# Patient Record
Sex: FEMALE | Race: OTHER
[De-identification: names, ages, dates, MRNs, and addresses within clinical notes are randomized per-mention and may not be internally consistent; named-entity substitution may affect disease eponyms.]

---

## 2021-01-13 ENCOUNTER — HOSPITAL ENCOUNTER (INPATIENT)
Dept: HOSPITAL 72 - EMR | Age: 61
LOS: 1 days | Discharge: HOME | DRG: 177 | End: 2021-01-14
Payer: MEDICARE

## 2021-01-13 VITALS — SYSTOLIC BLOOD PRESSURE: 115 MMHG | DIASTOLIC BLOOD PRESSURE: 67 MMHG

## 2021-01-13 VITALS — HEIGHT: 63 IN | BODY MASS INDEX: 28.35 KG/M2 | WEIGHT: 160 LBS

## 2021-01-13 VITALS — SYSTOLIC BLOOD PRESSURE: 105 MMHG | DIASTOLIC BLOOD PRESSURE: 62 MMHG

## 2021-01-13 VITALS — SYSTOLIC BLOOD PRESSURE: 97 MMHG | DIASTOLIC BLOOD PRESSURE: 58 MMHG

## 2021-01-13 VITALS — DIASTOLIC BLOOD PRESSURE: 87 MMHG | SYSTOLIC BLOOD PRESSURE: 128 MMHG

## 2021-01-13 DIAGNOSIS — J12.82: ICD-10-CM

## 2021-01-13 DIAGNOSIS — I95.9: ICD-10-CM

## 2021-01-13 DIAGNOSIS — Z88.0: ICD-10-CM

## 2021-01-13 DIAGNOSIS — U07.1: Primary | ICD-10-CM

## 2021-01-13 DIAGNOSIS — E87.2: ICD-10-CM

## 2021-01-13 DIAGNOSIS — I10: ICD-10-CM

## 2021-01-13 DIAGNOSIS — Z79.84: ICD-10-CM

## 2021-01-13 DIAGNOSIS — C78.2: ICD-10-CM

## 2021-01-13 DIAGNOSIS — K76.0: ICD-10-CM

## 2021-01-13 DIAGNOSIS — J96.91: ICD-10-CM

## 2021-01-13 DIAGNOSIS — C34.90: ICD-10-CM

## 2021-01-13 DIAGNOSIS — Z88.8: ICD-10-CM

## 2021-01-13 DIAGNOSIS — E11.65: ICD-10-CM

## 2021-01-13 DIAGNOSIS — E11.9: ICD-10-CM

## 2021-01-13 LAB
ADD MANUAL DIFF: YES
ALBUMIN SERPL-MCNC: 3.6 G/DL (ref 3.4–5)
ALBUMIN/GLOB SERPL: 0.9 {RATIO} (ref 1–2.7)
ALP SERPL-CCNC: 99 U/L (ref 46–116)
ALT SERPL-CCNC: 165 U/L (ref 12–78)
ANION GAP SERPL CALC-SCNC: 11 MMOL/L (ref 5–15)
APPEARANCE UR: CLEAR
APTT BLD: 29 SEC (ref 23–33)
APTT PPP: YELLOW S
AST SERPL-CCNC: 127 U/L (ref 15–37)
BILIRUB SERPL-MCNC: 0.2 MG/DL (ref 0.2–1)
BUN SERPL-MCNC: 15 MG/DL (ref 7–18)
CALCIUM SERPL-MCNC: 8.7 MG/DL (ref 8.5–10.1)
CHLORIDE SERPL-SCNC: 98 MMOL/L (ref 98–107)
CK SERPL-CCNC: 303 U/L (ref 26–308)
CO2 SERPL-SCNC: 25 MMOL/L (ref 21–32)
CREAT SERPL-MCNC: 1 MG/DL (ref 0.55–1.3)
ERYTHROCYTE [DISTWIDTH] IN BLOOD BY AUTOMATED COUNT: 12.9 % (ref 11.6–14.8)
FERRITIN SERPL-MCNC: 1006 NG/ML (ref 8–388)
GLOBULIN SER-MCNC: 3.8 G/DL
GLUCOSE UR STRIP-MCNC: NEGATIVE MG/DL
HCT VFR BLD CALC: 36.9 % (ref 37–47)
HGB BLD-MCNC: 12.6 G/DL (ref 12–16)
INR PPP: 1 (ref 0.9–1.1)
KETONES UR QL STRIP: NEGATIVE
LDH SERPL L TO P-CCNC: 282 U/L (ref 81–234)
LEUKOCYTE ESTERASE UR QL STRIP: (no result)
MCV RBC AUTO: 92 FL (ref 80–99)
NITRITE UR QL STRIP: NEGATIVE
PH UR STRIP: 5 [PH] (ref 4.5–8)
PLATELET # BLD: 105 K/UL (ref 150–450)
POTASSIUM SERPL-SCNC: 3.9 MMOL/L (ref 3.5–5.1)
PROT UR QL STRIP: (no result)
RBC # BLD AUTO: 3.99 M/UL (ref 4.2–5.4)
SODIUM SERPL-SCNC: 134 MMOL/L (ref 136–145)
SP GR UR STRIP: 1.01 (ref 1–1.03)
UROBILINOGEN UR-MCNC: NORMAL MG/DL (ref 0–1)
WBC # BLD AUTO: 2.5 K/UL (ref 4.8–10.8)

## 2021-01-13 PROCEDURE — 83615 LACTATE (LD) (LDH) ENZYME: CPT

## 2021-01-13 PROCEDURE — 81003 URINALYSIS AUTO W/O SCOPE: CPT

## 2021-01-13 PROCEDURE — 71045 X-RAY EXAM CHEST 1 VIEW: CPT

## 2021-01-13 PROCEDURE — 99291 CRITICAL CARE FIRST HOUR: CPT

## 2021-01-13 PROCEDURE — 85610 PROTHROMBIN TIME: CPT

## 2021-01-13 PROCEDURE — 86140 C-REACTIVE PROTEIN: CPT

## 2021-01-13 PROCEDURE — 84100 ASSAY OF PHOSPHORUS: CPT

## 2021-01-13 PROCEDURE — 93005 ELECTROCARDIOGRAM TRACING: CPT

## 2021-01-13 PROCEDURE — 36415 COLL VENOUS BLD VENIPUNCTURE: CPT

## 2021-01-13 PROCEDURE — 85730 THROMBOPLASTIN TIME PARTIAL: CPT

## 2021-01-13 PROCEDURE — 82550 ASSAY OF CK (CPK): CPT

## 2021-01-13 PROCEDURE — 83605 ASSAY OF LACTIC ACID: CPT

## 2021-01-13 PROCEDURE — 85025 COMPLETE CBC W/AUTO DIFF WBC: CPT

## 2021-01-13 PROCEDURE — 80048 BASIC METABOLIC PNL TOTAL CA: CPT

## 2021-01-13 PROCEDURE — 80053 COMPREHEN METABOLIC PANEL: CPT

## 2021-01-13 PROCEDURE — 87040 BLOOD CULTURE FOR BACTERIA: CPT

## 2021-01-13 PROCEDURE — 85007 BL SMEAR W/DIFF WBC COUNT: CPT

## 2021-01-13 PROCEDURE — 82962 GLUCOSE BLOOD TEST: CPT

## 2021-01-13 PROCEDURE — 71275 CT ANGIOGRAPHY CHEST: CPT

## 2021-01-13 PROCEDURE — 96365 THER/PROPH/DIAG IV INF INIT: CPT

## 2021-01-13 PROCEDURE — 85379 FIBRIN DEGRADATION QUANT: CPT

## 2021-01-13 PROCEDURE — 84484 ASSAY OF TROPONIN QUANT: CPT

## 2021-01-13 PROCEDURE — 96375 TX/PRO/DX INJ NEW DRUG ADDON: CPT

## 2021-01-13 PROCEDURE — 96367 TX/PROPH/DG ADDL SEQ IV INF: CPT

## 2021-01-13 PROCEDURE — 83880 ASSAY OF NATRIURETIC PEPTIDE: CPT

## 2021-01-13 PROCEDURE — 82728 ASSAY OF FERRITIN: CPT

## 2021-01-13 PROCEDURE — 83735 ASSAY OF MAGNESIUM: CPT

## 2021-01-13 RX ADMIN — BAMLANIVIMAB SCH MLS/HR: 35 INJECTION, SOLUTION INTRAVENOUS at 12:15

## 2021-01-13 RX ADMIN — BAMLANIVIMAB SCH MLS/HR: 35 INJECTION, SOLUTION INTRAVENOUS at 13:11

## 2021-01-13 NOTE — DIAGNOSTIC IMAGING REPORT
Indication: Shortness of breath

 

Technique: One view of the chest

 

Comparison: 10/6/2017

 

Findings: Interim development of bilateral peripheral infiltrates. There is some

blunting of the right costophrenic sulcus again demonstrated. Again demonstrated are

linear right perihilar opacities, likely areas of scarring given persistence since

prior study Heart size is normal.

 

Impression: Bilateral peripheral infiltrates, concerning for multifocal pneumonia,

possibly viral

 

Blunted right costophrenic sulcus, small effusion possible

## 2021-01-13 NOTE — CONSULTATION
Consult Note


Consult Note


DATE OF CONSULTATION: 1/13/2021


CONSULTING PHYSICIAN:  Torsten Augustin MD.





ATTENDING PHYSICIAN: Dr. To





REASON FOR CONSULTATION: COVID-19, history of lung cancer





HISTORY OF PRESENT ILLNESS:  


This is a 60-year-old female with history of diabetes mellitus, and stage IV 

lung cancer who was referred to ER by her pulmonologist for her Covid positive 

status with hypoxia.  However, patient was reported to be saturating well on 

room air on arrival.  Patient was initially afebrile, however was hypotensive.  

Patient reports 3 days history of pleuritic chest pain and shortness of breath 

along with headache and dizziness.  Given her high risk for PE, she was 

evaluated with CT angiogram of chest, which showed no evidence of acute 

pulmonary embolus or other acute thoracic vascular pathology.  Chest x-ray is 

notable for bilateral peripheral infiltrates, and possible small effusion on the

right.  The exact date of COVID-19 test is not available from EMR.





Patient was given Rocephin, and bamalnivimab in the ER and is awaiting 

admission.





PAST MEDICAL HISTORY: Diabetes mellitus, stage IV lung cancer





MEDICATIONS: Amlodipine, atorvastatin, codeine/promethazine, 

hydrocodone/acetaminophen, Metformin, sitagliptin





ALLERGIES: Diatrizoate meglumine, diatrizoate sodium, iodine, iopamidol, 

penicillins





FAMILY HISTORY: Unknown





PERSONAL/SOCIAL HISTORY: Lives at home with family





REVIEW OF SYSTEMS: Negative except mentioned in HPI





PHYSICAL EXAMINATION:


VITAL SIGNS:  Blood pressure 105/65, heart rate 85, respiratory rate 15,


weight 73 kg, height 160 cm.


General: Appears stated age, NAD; normal work of breathing on room air


HEENT:  Head exam reveals that the head is normocephalic, atraumatic


without deformity or unusual swelling.  Pupils are PERRLA.


CHEST AND LUNGS:  Reveals clear, normal, symmetrical breath sounds with no


adventitious sounds.


CARDIOVASCULAR:  Reveals normal S1, S2 without murmurs, rubs, or clicks.


ABDOMEN:  Soft with no tenderness or organomegaly.


RECTAL:  Deferred.


MUSCULOSKELETAL:  There is no tenderness to palpation.  Range of motion is


normal.


NEUROLOGICAL:  Alert and oriented x3 , nonfocal





LABORATORY DATA:  


Laboratory testing shows WBC 2.5, hemoglobin 12.6, hematocrit 36.9, platelet 

count 105





Chemistries show sodium 134, glucose 143, lactic acid 3.9, ferritin 1006, AST 

127, , 


Urinalysis shows 1+ protein, 1+ leuk esterase


Assessment/Plan


1. COVID-19 pneumonia


   - s/p Bamlanivimab in ER


   - s/p Decadron once in ER


   - s/p Ceftriaxoone once in ER


   - currently saturating at 90-94% on RA


   -Monitor for hypoxia, and provide supplemental oxygen as needed


   - We will initiate rocephin


2. Elevated inflammatory markers


   -CT angio was negative for PE


   -DVT prophylaxis recommended


3. Transaminitis


   Trend LFTs


4. Diabetes mellitus with hyperglycemia


   -On glucose lowering agents


5. Hyponatremia


   -Per primary MD


   - IVF


6.  Hypotension; resolved


   - IVF


The care for this patient was discussed with my supervising physician.


Time spent for this case was approximately 31 minutes.











Rusty Esquivel                 Jan 13, 2021 17:43

## 2021-01-13 NOTE — EMERGENCY ROOM REPORT
History of Present Illness


General


Chief Complaint:  Dyspnea/Respdistress


Source:  Patient





Present Illness


HPI


60-year-old female with history of stage IV lung cancer sent by pulmonologist 

for Bamlanivimab treatment due to positive Covid status.  Patient with a steady 

97% room air is afebrile however hypotensive.  Complains of 3 days of pleuritic 

chest pain and shortness of breath.  Planes of headache and dizziness.  Has not 

taken medication for symptom relief.  Patient also diabetic currently on 3 

different medication.  Patient reports that she gets a rash with IV contrast 

dexamethasone and Benadryl surgeon of IV contrast for CTA chest with patient has

high risk factors for possible PE.


Allergies:  


Coded Allergies:  


     DIATRIZOATE MEGLUMINE (Verified  Allergy, Intermediate, rash, hives on 

face, neck and arms, 7/14/17)


   from 7/3/17


     DIATRIZOATE SODIUM (Verified  Allergy, Intermediate, rash, hives on face, 

neck and arms, 7/14/17)


   from 7/3/17


     IODINE (Verified  Allergy, Intermediate, 7/14/17)


   from gastrografin


     IOPAMIDOL (Verified  Allergy, Intermediate, rash, hives on face, neck and 

arms, 7/14/17)


   occurance from 7/3/17. [id number]


     PENICILLINS (Unverified  Allergy, Unknown, 7/3/17)





COVID-19 Screening


Contact w/high risk pt:  Yes


Experienced COVID-19 symptoms?:  Yes


COVID-19 Testing performed PTA:  Yes


COVID-19 Screening:  Positive COVID-19


COVID-19 Testing Source:  nasal





Patient History


Past Medical History:  see triage record


Past Surgical History:  none


Pertinent Family History:  none


Pregnant Now:  No


Immunizations:  UTD


Reviewed Nursing Documentation:  PMH: Agreed; PSxH: Agreed





Nursing Documentation-PMH


Past Medical History:  No History, Except For


Hx Cardiac Problems:  No - lung cancer rt side


Hx Hypertension:  Yes


Hx Asthma:  Yes


Hx Diabetes:  Yes - Type 2 oral medications


Hx Cancer:  No


Hx Gastrointestinal Problems:  No


Hx Neurological Problems:  No





Review of Systems


All Other Systems:  negative except mentioned in HPI





Physical Exam





Vital Signs








  Date Time  Temp Pulse Resp B/P (MAP) Pulse Ox O2 Delivery O2 Flow Rate FiO2


 


1/13/21 12:20  85 18     98


 


1/13/21 12:20 98.2   128/87 98 Room Air  








Sp02 EP Interpretation:  reviewed, abnormal - hypotensive


General Appearance:  no apparent distress, alert, GCS 15, non-toxic


Head:  normocephalic, atraumatic


Eyes:  bilateral eye normal inspection, bilateral eye PERRL


ENT:  hearing grossly normal, normal pharynx, no angioedema, normal voice


Neck:  full range of motion, supple, no meningismus, supple/symm/no masses


Respiratory:  no respiratory distress, no retraction, no accessory muscle use


Cardiovascular #1:  regular rate, rhythm, no edema


Gastrointestinal:  soft, no organomegaly, no peritonitis


Musculoskeletal:  back normal, no calf tenderness


Neurologic:  alert, motor strength/tone normal, oriented x3, sensory intact, 

responsive, speech normal


Psychiatric:  judgement/insight normal, memory normal, mood/affect normal, no 

suicidal/homicidal ideation


Skin:  no rash


Lymphatic:  no adenopathy





Procedures


Critical Care Time


Critical Care Time


Total critical care time; approximately 31 minutes.  Due to a high probability 

of clinically significant, life threatening deterioration, the patient required 

my highest level of preparedness to intervene emergently and I personally spent 

this critical care time directly and personally managing the patient.  This 

critical care time included obtaining a history; examining the patient; pulse 

oximetry; ordering and reviewing of studies; arranging urgent treatment with 

development of a management plan; evaluation of patient's response to treatment;

frequent reassessment; and, discussions with other providers.  This critical 

care time was performed to assess and manage the high probability of imminent, 

life-threatening deterioration that could result in multiorgan failure it was 

exclusive of separate billable procedures and treating other patients and 

teaching time.  Please see MDM section and the rest of the note for further 

information on patient assessment and treatment.





Medical Decision Making


PA Attestation


All diagnosis and treatment plans were discussed and reviewed by my supervising 

physician Dr. Hays


Diagnostic Impression:  


   Primary Impression:  


   Sepsis


   Additional Impressions:  


   Coronavirus infection


   Pleural malignant neoplasm


ER Course


60-year-old female with history of stage IV lung cancer sent by pulmonologist 

for Bamlanivimab treatment due to positive Covid status.  Patient with a steady 

97% room air is afebrile however hypotensive.  Complains of 3 days of pleuritic 

chest pain and shortness of breath.  Planes of headache and dizziness.  Has not 

taken medication for symptom relief.  Patient also diabetic currently on 3 

different medication.  Patient reports that she gets a rash with IV contrast 

dexamethasone and Benadryl surgeon of IV contrast for CTA chest with patient has

high risk factors for possible PE.





Ddx considered but are not limited to: bronchitis, PNA, URI viral, bacterial 

brochitis, PE, respiratory distress, hypoxia, Covid pneumonia





Vital signs: are WNL, pt. is afebrile





H&PE are most consistent with: Sepsis,





ORDERS: ER Covid order set, CTA chest





ED INTERVENTIONS: Rocephin,Bamalnivimab





Patient was admitted with diagnosis of sepsis      to Dr. Augustin     under 

supervision of : Saúl








pt stable at time of admission


EKG Diagnostic Results


Rate:  tachycardiac


Rhythm:  other - tachy


ST Segments:  no acute changes


Other Impression


No acute ST changes


ASA given to the pt in ED:  No





Chest X-Ray Diagnostic Results


Chest X-Ray Diagnostic Results :  


   Chest X-Ray Ordered:  Yes


   # of Views/Limited/Complete:  1 View


   Indication:  Shortness of Breath


   EP Interpretation:  Yes


   PA Xray:  Interpretation reviewed, by supervising MD, and agrees with 

findings.


   Interpretation:  other - Pneumonia


   Impression:  Other - Pneumonia


   Electronically Signed by:  Selvin Schulte PA-C





CT/MRI/US Diagnostic Results


CT/MRI/US Diagnostic Results :  


   Imaging Test Ordered:  CTA chest


   Impression


Impression: No evidence of acute pulmonary embolus or other acute thoracic 

vascular


pathology


 


Groundglass opacities in the left lung, raises concern for multifocal pneumonia,


quite possibly viral. Note in usual absence of similar findings on the right


 


Right pulmonary perihilar mass, suspicious for malignancy, also previously 

described.


Correlate with results in any prior workup


 


Pleural thickening and nodularity on the right, somewhat similar to findings


previously described but more advanced. As previously reported, likely 

represents


metastatic deposits. The absence of pleural fluid and the presence of possible


calcification in the pleural space raises possibility of interim pleurodesis.


Correlate with surgical history


 


Right basilar scarring and/or atelectasis


 


Mild cardiomegaly


 


Enlarged fatty liver, also previously reported





Last Vital Signs








  Date Time  Temp Pulse Resp B/P (MAP) Pulse Ox O2 Delivery O2 Flow Rate FiO2


 


1/13/21 14:24 98.0 87 17 97/58 99 Room Air  


 


1/13/21 12:20        98








Disposition:  ADMITTED AS INPATIENT


Condition:  Serious


Referrals:  


Torsten Augustin MD (PCP)











Selvin Hoffman      Jan 13, 2021 14:58

## 2021-01-13 NOTE — NUR
TRANSFER TO FLOOR:



Patient transferred to TELE at rm 207 via [facility] with cardiac monitor 
accompanied by RN.  Belongings checked and given to RN. Pt transferred safely 
to bed and endorsed to RN

## 2021-01-13 NOTE — NUR
NURSE NOTES:

DR. GAMA CALLED AND STATED THAT THE PT WILL BE ADMITTED UNDER HIS CARE AND DR. HUANG 
WILL BE ADDED AS CONSULT. ADMISSION ORDERS GIVEN WILL NOTE & CARRY OUT. EXPLAINED TO DR. GAMA THAT PT IS ON A TRIAL MEDICATION FOR LUNG CANCER TX, AND PT BROUGHT MEDICATION WITH 
HER TO THE HOSPITAL, BUT PER HOSPITAL PHARMACIST WE WILL NOT BE ABLE TO DISPENSE MEDICATION 
BECAUSE MEDICATION HAS NO NAME ON THE BOTTLE.  DR. GAMA STATED HE WILL FALLOW UP WITH DR. SCHULTZ IN AM REGARDING CANCER DRUGS.

## 2021-01-13 NOTE — NUR
NURSE NOTES:

AT 2020 RECEIVED REPORT FROM FRANCOISE MIGUEL FROM ED.PT ARRIVED ON THE FLOOR AT 2045 VIA GURNEY. 
ASSISTED PT FROM GURNEY TO BED, PT ABLE TO AMBULATE WITH SLOW STEADY GAIT. PT ALERT, 
ORIENTED X4, ABLE TO MAKE NEEDS KNOWN IN [place] & ENGLISH. NO RESP DISTRESS NOTED, SATING 
92-93% ON ROOM AIR. PLACED ON CARDIAC MONITOR RUNNING NORMAL SINUS RHYTHM. BELONGING LIST 
CHECK ED NURSE. BODY CHECK DONE. ASSISTED PT TO REST ROOM. ORIENTED PT ROOM, UNIT. PLACED 
BED IN LOW POSITION & LOCKED. SIDE RAILS UP X3. CALL LIGHT WITH IN REACH. BED ALARM ON.  
WILL CALL DR. LESE FOR ADMISSION ORDERS.

## 2021-01-13 NOTE — NUR
-------------------------------------------------------------------------------

           *** Note undone in EDM - 01/13/21 at 1650 by TRAVON ***           

-------------------------------------------------------------------------------

ED Nurse Note:

Pt is y

## 2021-01-13 NOTE — NUR
ED Nurse Note:

Pt walked in, was referred by Dr Augustin for COVID antibiotics. Pt is alert and 
orientedx4, ambulatory. She has been seen by ERMD. Pt is placed on monitor. She 
had SOB at doctors office, O2 sat is 99% RA.

## 2021-01-13 NOTE — DIAGNOSTIC IMAGING REPORT
ndication: Chest pain, shortness of breath

 

Technique: IV administration nonionic contrast. Spiral acquisitions obtained from the

lung bases to the lung apices. Multiplanar and 3-D reconstructions were generated on

an integrated workstation. Total dose length product 192 mGycm. CTDIvol(s) 3, 24, 4

mGy.  Dose reduction achieved using automated exposure control

 

 

Comparison: Conventional chest CT 7/3/2017

 

Findings: The pulmonary arteries are adequately opacified. No intraluminal filling

defects or other findings to suggest acute pulmonary embolus are demonstrated. No

evidence of pulmonary arterial dilatation. No evidence of thoracic aortic aneurysm or

dissection. Normal branching anatomy and caliber of the great neck vessels. No

evidence of right ventricular dilatation. The included proximal abdominal visceral

vessels are unremarkable.

 

There is a mass in the right pulmonary perihilar region. This measures approximately

4.6 x 3.3 x 3.1 cm in diameter. It is mostly hypoattenuating with some areas of

hyperattenuation. Some masslike opacity also extends from this into the major

fissure. There are some areas of density at the pleural surface of the right lower

lobe. The largest of these is somewhat masslike, measures 2.1 x 4.5 cm. Numerous

bands of atelectasis are seen throughout the right lower lobe and to some extent

within the right upper and middle lobes. There is considerable pleural thickening on

the right, particularly in the mid and lower lung. Some of the pleural thickening is

very dense. There may be trace pleural fluid on the right. Findings are difficult to

compare to the previous exam, as previous study demonstrated a massive pleural

effusion resulting in atelectasis of the entire right lower lobe, most of the right

middle lobe, and much of the upper lobe. However, the right hilar mass was reported

on the previous exam

 

Numerous areas of groundglass opacity are seen scattered throughout the left lung.

Findings in the left lung are new since the previous study. No pleural effusion or

mass is seen on the left. No dense consolidation is seen on the left. Findings on the

left are new since the prior study

 

The heart is mildly enlarged. No pericardial effusion. There is some prominence to

the perihilar nodes on the left but no alice adenopathy. No mediastinal

lymphadenopathy. The included portion of the thyroid is unremarkable. No axillary or

chest wall mass or adenopathy.

 

The included upper abdominal anatomy demonstrates somewhat enlarged hypoattenuating

liver. This is also demonstrated previously.

 

Impression: No evidence of acute pulmonary embolus or other acute thoracic vascular

pathology

 

Groundglass opacities in the left lung, raises concern for multifocal pneumonia,

quite possibly viral. Note in usual absence of similar findings on the right

 

Right pulmonary perihilar mass, suspicious for malignancy, also previously described.

Correlate with results in any prior workup

 

Pleural thickening and nodularity on the right, somewhat similar to findings

previously described but more advanced. As previously reported, likely represents

metastatic deposits. The absence of pleural fluid and the presence of possible

calcification in the pleural space raises possibility of interim pleurodesis.

Correlate with surgical history

 

Right basilar scarring and/or atelectasis

 

Mild cardiomegaly

 

Enlarged fatty liver, also previously reported

 

 

 

The CT scanner at [facility] is accredited by the [employer] College of

Radiology and the scans are performed using protocols designed to limit radiation

exposure to as low as reasonably achievable to attain images of sufficient resolution

adequate for diagnostic evaluation.

## 2021-01-14 VITALS — DIASTOLIC BLOOD PRESSURE: 75 MMHG | SYSTOLIC BLOOD PRESSURE: 114 MMHG

## 2021-01-14 VITALS — DIASTOLIC BLOOD PRESSURE: 64 MMHG | SYSTOLIC BLOOD PRESSURE: 103 MMHG

## 2021-01-14 VITALS — DIASTOLIC BLOOD PRESSURE: 67 MMHG | SYSTOLIC BLOOD PRESSURE: 102 MMHG

## 2021-01-14 VITALS — SYSTOLIC BLOOD PRESSURE: 105 MMHG | DIASTOLIC BLOOD PRESSURE: 67 MMHG

## 2021-01-14 VITALS — DIASTOLIC BLOOD PRESSURE: 65 MMHG | SYSTOLIC BLOOD PRESSURE: 112 MMHG

## 2021-01-14 LAB
ADD MANUAL DIFF: YES
ANION GAP SERPL CALC-SCNC: 6 MMOL/L (ref 5–15)
BUN SERPL-MCNC: 14 MG/DL (ref 7–18)
CALCIUM SERPL-MCNC: 8.8 MG/DL (ref 8.5–10.1)
CHLORIDE SERPL-SCNC: 98 MMOL/L (ref 98–107)
CO2 SERPL-SCNC: 27 MMOL/L (ref 21–32)
CREAT SERPL-MCNC: 0.8 MG/DL (ref 0.55–1.3)
ERYTHROCYTE [DISTWIDTH] IN BLOOD BY AUTOMATED COUNT: 12.9 % (ref 11.6–14.8)
HCT VFR BLD CALC: 33.9 % (ref 37–47)
HGB BLD-MCNC: 11.5 G/DL (ref 12–16)
MCV RBC AUTO: 92 FL (ref 80–99)
PHOSPHATE SERPL-MCNC: 2.4 MG/DL (ref 2.5–4.9)
PLATELET # BLD: 103 K/UL (ref 150–450)
POTASSIUM SERPL-SCNC: 3.9 MMOL/L (ref 3.5–5.1)
RBC # BLD AUTO: 3.68 M/UL (ref 4.2–5.4)
SODIUM SERPL-SCNC: 131 MMOL/L (ref 136–145)
WBC # BLD AUTO: 2.9 K/UL (ref 4.8–10.8)

## 2021-01-14 RX ADMIN — INSULIN ASPART SCH UNITS: 100 INJECTION, SOLUTION INTRAVENOUS; SUBCUTANEOUS at 16:30

## 2021-01-14 RX ADMIN — INSULIN ASPART SCH UNITS: 100 INJECTION, SOLUTION INTRAVENOUS; SUBCUTANEOUS at 06:30

## 2021-01-14 RX ADMIN — INSULIN ASPART SCH UNITS: 100 INJECTION, SOLUTION INTRAVENOUS; SUBCUTANEOUS at 12:49

## 2021-01-14 NOTE — NUR
NURSE NOTES:

Patient discharged home with daughter Silvia Colon in private vehicle. Patient verified and 
acknowledged belongings list and patient instructions given about patients stay at hospital, 
diagnosis and discharge instructions. Patient education on medications to be taken at home 
and with prescriptions given by MD. Patient is stable with stable VS, no complaints of pain 
and no acute signs of distress. The patients IV was removed and was clean and intact. The 
patients Tele monitor was removed. All patient questions answered and request met. The 
patient was walked down to private vehicle and was assisted into vehicle with LAMONT Steele. 
The patient left the floor at 1900.

## 2021-01-14 NOTE — NUR
CASE MANAGEMENT:REVIEW



60 YR OLD FEMALE WALKED INTO ER



CC: SOB AND SENT BY DR LEES



SI: SEPSIS. COVID INFECTION. LUNG CANCER

BILATERAL INFILTRATES

98.2   85  18  105/62  98% ON RA

WBC-2.5   PLT-105  LACTIC ACID+3.90



IS: 1L NS BOLUS

IV DECADRON

IV BENADRYL

IV FENTANYL

IV ROCEPHIN

IV BAMLANIVIMAB  (EXPERIMENTAL )

**: TO TELEMETRY

DCP: FROM HOME

## 2021-01-14 NOTE — NUR
NURSE NOTES:

Received patient from LAMONT Torres. Patient seen in bed in semifowlers position eating 
breakfast and watching TV. The patient does not complain of any pain and is under no acute 
signs of distress. The patient has a right 20G AC running NS at 50cc that is clean patent 
and intact. The patient is on room air and is saturating within normal limits. the Patients 
bed is in lowest position, locked, side rails x2, call light within reach and bed alarm in 
zone 1.

## 2021-01-14 NOTE — HISTORY & PHYSICAL
History of Present Illness


General


Reason for Hospitalization:  Dyspnea/Respdistress





Present Illness


HPI


60 year old female with the past medical history of DM, HTN, stage IV lung 

cancer sent by pulmonologist for Bamlanivimab treatment due to positive Covid 

status.  Patient with a steady 97% room air is afebrile however hypotensive.  

Complains of 3 days of pleuritic chest pain and shortness of breath.  Planes of 

headache and dizziness.  Has not taken medication for symptom relief.  Patient 

also diabetic currently on 3 different medication.  Patient reports that she 

gets a rash with IV contrast dexamethasone and Benadryl surgeon of IV contrast 

for CTA chest with patient has high risk factors for possible PE.





Impression: No evidence of acute pulmonary embolus or other acute thoracic 

vascular


pathology


 


Groundglass opacities in the left lung, raises concern for multifocal pneumonia,


quite possibly viral. Note in usual absence of similar findings on the right


 


Right pulmonary perihilar mass, suspicious for malignancy, also previously 

described.


Correlate with results in any prior workup


 


Pleural thickening and nodularity on the right, somewhat similar to findings


previously described but more advanced. As previously reported, likely 

represents


metastatic deposits. The absence of pleural fluid and the presence of possible


calcification in the pleural space raises possibility of interim pleurodesis.


Correlate with surgical history


 


Right basilar scarring and/or atelectasis


 


Mild cardiomegaly


 


Enlarged fatty liver, also previously reported


Allergies:  


Coded Allergies:  


     DIATRIZOATE MEGLUMINE (Verified  Allergy, Intermediate, rash, hives on 

face, neck and arms, 7/14/17)


   from 7/3/17


     DIATRIZOATE SODIUM (Verified  Allergy, Intermediate, rash, hives on face, 

neck and arms, 7/14/17)


   from 7/3/17


     IODINE (Verified  Allergy, Intermediate, 7/14/17)


   from gastrografin


     IOPAMIDOL (Verified  Allergy, Intermediate, rash, hives on face, neck and 

arms, 7/14/17)


   occurance from 7/3/17. [id number]


     PENICILLINS (Unverified  Allergy, Unknown, 7/3/17)





COVID-19 Screening


Contact w/high risk pt:  Yes


Experienced COVID-19 symptoms?:  Yes


Coronavirus symptoms experienc:  Shortness of Breath





Medication History


Scheduled


Amlodipine Besylate/Benazepril 5-20 Mg (Lotrel 5-20 Mg Capsule*), 1 CAP ORAL 

DAILY, (Reported)


Atorvastatin Calcium* (Atorvastatin Calcium*), 5 MG ORAL BEDTIME, (Reported)


Azithromycin* (Zithromax*), 500 MG ORAL DAILY


Dexamethasone (Dexamethasone), 6 MG ORAL DAILY


Metformin Hcl* (Metformin Hcl*), 1,000 MG ORAL DAILY, (Reported)


Sitagliptin* (Januvia*), 100 MG ORAL DAILY, (Reported)


[Patient's Own Med], 1 EA ORAL DAILY


[Patient's Own Med], 1 EA ORAL DAILY





Scheduled PRN


Codeine/Promethazine Hcl* (Promethazine-Codeine Syrup*), 5 ML ORAL EVERY 8 HOURS

PRN for For Cough, (Reported)


Hydrocodone Bit/Acetaminophen * (Norco *), 1 TAB ORAL Q4H PRN for 

For Pain, (Reported)





Discontinued Medications


Hydrocodone Bit/Acetaminophen * (Norco *), 1 TAB ORAL Q4H PRN for 

For Pain, (Reported)


   Discontinued Reason: MD discontinued med





Patient History


Healthcare decision maker





Resuscitation status





Advanced Directive on File








Review of Systems


Review of Symptoms


General ROS: no weight loss or fever


Psychological ROS: no depression or mood changes, no memory loss


Ophthalmic ROS: no visual changes or eye irritation


ENT ROS: no nasal congestion, hearing loss, dizziness


Allergy and Immunology ROS: no allergic symptoms or urticaria


Hematological and Lymphatic ROS: no swollen glands, unusual bleeding or bruising


Endocrine ROS: no polyuria, polydipsia, weight changes, temperature intolerance


Respiratory ROS: no cough, shortness of breath, or wheezing


Cardiovascular ROS: no chest pain or dyspnea on exertion


Gastrointestinal ROS: denies abdominal pain, bright red blood in stool.


Musculoskeletal ROS: no myalgias or arthralgias


Neurological ROS: no TIA or stroke symptoms


Dermatological ROS: no new or changing skin lesions, rashes or pruritis





Physical Exam


Physical Exam


General appearance:  alert, cooperative, no distress, appears stated age


Head:  Normocephalic, without obvious abnormality, atraumatic


Eyes:  conjunctivae/corneas clear. PERRL, EOM's intact. Fundi benign


Throat:  Lips, mucosa, and tongue normal. Teeth and gums normal


Neck:  supple, symmetrical, trachea midline, no adenopathy, thyroid: not 

enlarged, symmetric, no tenderness/mass/nodules, no carotid bruit and no JVD


Lungs:  clear to auscultation bilaterally


Heart:  regular rate and rhythm, S1, S2 normal, no murmur, click, rub or gallop


Abdomen:  soft, non-tender. Bowel sounds normal. No masses,  no organomegaly


Extremities:  extremities normal, atraumatic, no cyanosis or edema


Pulses:  2+ and symmetric


Skin:  Skin color, texture, turgor normal. No rashes or lesions


Neurologic:  Grossly normal





Last 24 Hour Vital Signs








  Date Time  Temp Pulse Resp B/P (MAP) Pulse Ox O2 Delivery O2 Flow Rate FiO2


 


1/14/21 04:00  91      


 


1/14/21 04:00 97.9 74 20 112/65 (81) 92   


 


1/14/21 00:00  105      


 


1/14/21 00:00 99.6 82 20 114/75 (88) 93   


 


1/13/21 22:05      Room Air  


 


1/13/21 20:40 98.0 84 19 115/67 97 Room Air  98


 


1/13/21 18:53 98.0 84 19 115/67 97 Room Air  


 


1/13/21 16:49 98.0 85 15 105/62 96 Room Air  


 


1/13/21 14:24 98.0 87 17 97/58 99 Room Air  


 


1/13/21 13:39 98.0       


 


1/13/21 12:33 98.1 96 20 92/60 (71) 95 Room Air  


 


1/13/21 12:20 98.2 85 18 128/87 98 Room Air  


 


1/13/21 12:20  85 18     98

















Intake and Output  


 


 1/13/21 1/14/21





 19:00 07:00


 


Intake Total 0 ml 300 ml


 


Balance 0 ml 300 ml


 


  


 


Intake Oral 0 ml 300 ml


 


# Voids  3











Laboratory Tests








Test


 1/13/21


12:33 1/13/21


13:23 1/13/21


14:26 1/14/21


05:53


 


White Blood Count


 2.5 K/UL


(4.8-10.8)  L 


 


 





 


Red Blood Count


 3.99 M/UL


(4.20-5.40)  L 


 


 





 


Hemoglobin


 12.6 G/DL


(12.0-16.0) 


 


 





 


Hematocrit


 36.9 %


(37.0-47.0)  L 


 


 





 


Mean Corpuscular Volume 92 FL (80-99)     


 


Mean Corpuscular Hemoglobin


 31.6 PG


(27.0-31.0)  H 


 


 





 


Mean Corpuscular Hemoglobin


Concent 34.2 G/DL


(32.0-36.0) 


 


 





 


Red Cell Distribution Width


 12.9 %


(11.6-14.8) 


 


 





 


Platelet Count


 105 K/UL


(150-450)  L 


 


 





 


Mean Platelet Volume


 8.5 FL


(6.5-10.1) 


 


 





 


Neutrophils (%) (Auto)


 % (45.0-75.0)


 


 


 





 


Lymphocytes (%) (Auto)


 % (20.0-45.0)


 


 


 





 


Monocytes (%) (Auto)  % (1.0-10.0)     


 


Eosinophils (%) (Auto)  % (0.0-3.0)     


 


Basophils (%) (Auto)  % (0.0-2.0)     


 


Differential Total Cells


Counted 100  


 


 


 





 


Neutrophils % (Manual) 74 % (45-75)     


 


Lymphocytes % (Manual) 24 % (20-45)     


 


Monocytes % (Manual) 2 % (1-10)     


 


Eosinophils % (Manual) 0 % (0-3)     


 


Basophils % (Manual) 0 % (0-2)     


 


Band Neutrophils 0 % (0-8)     


 


Platelet Estimate Decreased  L   


 


Platelet Morphology Normal     


 


Red Blood Cell Morphology Normal     


 


Prothrombin Time


 11.1 SEC


(9.30-11.50) 


 


 





 


Prothromb Time International


Ratio 1.0 (0.9-1.1)  


 


 


 





 


Activated Partial


Thromboplast Time 29 SEC (23-33)


 


 


 





 


D-Dimer


 0.33 mg/L FEU


(0.00-0.49) 


 


 





 


Sodium Level


 134 MMOL/L


(136-145)  L 


 


 





 


Potassium Level


 3.9 MMOL/L


(3.5-5.1) 


 


 





 


Chloride Level


 98 MMOL/L


() 


 


 





 


Carbon Dioxide Level


 25 MMOL/L


(21-32) 


 


 





 


Anion Gap


 11 mmol/L


(5-15) 


 


 





 


Blood Urea Nitrogen


 15 mg/dL


(7-18) 


 


 





 


Creatinine


 1.0 MG/DL


(0.55-1.30) 


 


 





 


Estimat Glomerular Filtration


Rate 56.4 mL/min


(>60) 


 


 





 


Glucose Level


 143 MG/DL


()  H 


 


 





 


Lactic Acid Level


 3.90 mmol/L


(0.4-2.0)  H 


 2.00 mmol/L


(0.66-2.22) 





 


Calcium Level


 8.7 MG/DL


(8.5-10.1) 


 


 





 


Ferritin


 1006 NG/ML


(8-388)  H 


 


 





 


Total Bilirubin


 0.2 MG/DL


(0.2-1.0) 


 


 





 


Aspartate Amino Transf


(AST/SGOT) 127 U/L


(15-37)  H 


 


 





 


Alanine Aminotransferase


(ALT/SGPT) 165 U/L


(12-78)  H 


 


 





 


Alkaline Phosphatase


 99 U/L


() 


 


 





 


Lactate Dehydrogenase


 282 U/L


()  H 


 


 





 


Total Creatine Kinase


 303 U/L


() 


 


 





 


Troponin I


 0.000 ng/mL


(0.000-0.056) 


 


 





 


C-Reactive Protein,


Quantitative 0.6 mg/dL


(0.00-0.90) 


 


 





 


Pro-B-Type Natriuretic Peptide


 32 pg/mL


(0-125) 


 


 





 


Total Protein


 7.4 G/DL


(6.4-8.2) 


 


 





 


Albumin


 3.6 G/DL


(3.4-5.0) 


 


 





 


Globulin 3.8 g/dL     


 


Albumin/Globulin Ratio


 0.9 (1.0-2.7)


L 


 


 





 


Urine Color  Yellow    


 


Urine Appearance  Clear    


 


Urine pH  5 (4.5-8.0)    


 


Urine Specific Gravity


 


 1.015


(1.005-1.035) 


 





 


Urine Protein


 


 1+ (NEGATIVE)


H 


 





 


Urine Glucose (UA)


 


 Negative


(NEGATIVE) 


 





 


Urine Ketones


 


 Negative


(NEGATIVE) 


 





 


Urine Blood


 


 Negative


(NEGATIVE) 


 





 


Urine Nitrite


 


 Negative


(NEGATIVE) 


 





 


Urine Bilirubin


 


 Negative


(NEGATIVE) 


 





 


Urine Urobilinogen


 


 Normal MG/DL


(0.0-1.0) 


 





 


Urine Leukocyte Esterase


 


 1+ (NEGATIVE)


H 


 





 


Urine RBC


 


 0 /HPF (0 - 2)


 


 





 


Urine WBC


 


 0-2 /HPF (0 -


2) 


 





 


Urine Squamous Epithelial


Cells 


 Occasional


/LPF 


 





 


Urine Bacteria


 


 Occasional


/HPF (NONE) 


 





 


POC Whole Blood Glucose


 


 


 


 127 MG/DL


()  H








Height (Feet):  5


Height (Inches):  3.00


Weight (Pounds):  160


Medications





Current Medications








 Medications


  (Trade)  Dose


 Ordered  Sig/Elian


 Route


 PRN Reason  Start Time


 Stop Time Status Last Admin


Dose Admin


 


 Acetaminophen


  (Tylenol)  650 mg  Q6H  PRN


 ORAL


 PAIN1-3  1/13/21 22:30


 2/12/21 22:29  1/13/21 22:55





 


 Acetaminophen


  (Tylenol)  650 mg  Q6H  PRN


 ORAL


 TEMP 100.5  1/13/21 22:30


 2/12/21 22:29   





 


 Amlodipine


 Besylate


  (Norvasc)  5 mg  DAILY


 ORAL


   1/14/21 09:00


 2/13/21 08:59   





 


 Atorvastatin


 Calcium


  (Lipitor)  5 mg  BEDTIME


 ORAL


   1/14/21 21:00


 4/14/21 20:59   





 


 Azithromycin 500


 mg/Dextrose  275 ml @ 


 275 mls/hr  Q24HRS


 IV


   1/14/21 09:00


 1/20/21 09:59   





 


 Benazepril HCl


  (Lotensin)  20 mg  DAILY


 ORAL


   1/14/21 09:00


 2/13/21 08:59   





 


 Ceftriaxone


 Sodium 1 gm/


 Sodium Chloride  55 ml @ 


 110 mls/hr  DAILY


 IVPB


   1/14/21 09:00


 1/21/21 08:59   





 


 Dexamethasone


 Sodium Phosphate


  (Decadron 4mg/ml


 vial)  6 mg  DAILY


 IVP


   1/14/21 09:00


 4/14/21 08:59   





 


 Dextrose


  (Dextrose 50%)  25 ml  Q30M  PRN


 IV


 Hypoglycemia  1/13/21 22:30


 4/13/21 22:29   





 


 Dextrose


  (Dextrose 50%)  50 ml  Q30M  PRN


 IV


 Hypoglycemia  1/13/21 22:30


 4/13/21 22:29   





 


 Heparin Sodium


  (Porcine)


  (Heparin 5000


 units/ml)  5,000 units  EVERY 12  HOURS


 SUBQ


   1/14/21 09:00


 2/28/21 08:59 UNV  





 


 Insulin Aspart


  (NovoLOG)    BEFORE MEALS AND  HS


 SUBQ


   1/14/21 06:30


 4/14/21 06:29   





 


 Iohexol


  (Omnipaque 350


 100ml)  100 ml  NOW  PRN


 INJ


 Radiology Procedure  1/13/21 12:45


 1/15/21 12:44   





 


 Patient Own


 Medication


  (Patient's Own


 Med)  1 ea  DAILY


 ORAL


   1/14/21 09:00


 2/13/21 08:59   





 


 Patient Own


 Medication


  (Patient's Own


 Med)  1 ea  DAILY


 ORAL


   1/14/21 09:00


 2/13/21 08:59   





 


 Sodium Chloride  1,000 ml @ 


 50 mls/hr  Q20H


 IV


   1/13/21 18:00


 2/12/21 17:59  1/13/21 18:38














Assessment/Plan


Diagnosis


Axis I:


#Hypoxemic resp failure - due to COVID infection


#lactic acidosis 


#COVID pneumonia


#Stage 4 lung cancer 


#hypertension


#DM





- admit to tele


- IVF 


- pulm consulted 


- dexamethasone 6mg IV daily 


- ceftriaxone 1g daily 


- azithromycin 500mg x1


- resume amlodipine 5mg daily 


- benazopril 20mg daily 


- atorvastatin 5mg daily 


- monitor lytes 


- avoid nephrotoxins 





Time spent 70min





MIPS


Hospital declaration


  INPATIENT level of care is warranted for this patient because patient is a 89 

year old with *** who presents with suspicion of ***. I have a high level of 

concern because ***. Patient is at high risk for ***. Plan of care/treatment 

include ***. Patient care is expected to be greater than 2 midnights.  





  OBSERVATION level of care is warranted for this patient. Patient is a 89 year 

old with *** who presents with ***. Patient will be admitted for 1 midnight, but

if additional night(s) is/are necessary, patient will be converted to inpatient 

status for the entire hospitalization





Disposition: Once the patient is stable to leave the hospital, I anticipate the 

patient will likely be discharged to the following environment:***





Estimated discharge date: ***





I spent 70 minutes on this patient's case, and *** minutes was dedicated to 

counseling and/or care coordination. 








MIPS ([employer]centive Payment System) 


Applicable CPT: 51025, 83374





CHECK ALL THAT ARE MET:





  Measure #5 (CHF): All ages. Prescribe ACE/ARB upon discharge for patients with

left ventricular systolic dysfunction. If not, the reason is clearly documented 

in the medical chart.





  Measure #8 (CHF): All ages. Prescribe a beta blocker upon discharge for 

patients with left ventricular systolic dysfunction. If not, the reason is 

clearly documented in the medical chart.





  Measure #47 Advance care plan or surrogate decision maker documented in the 

medical record. 





  Measure #130 The provider has documented, updated, or reviewed the patients 

current medication list and has documented it in the patients note.  





  Measure #374 (All): Send report to referring provider. 





  Measure #407(Sepsis due to MSSA bacteremia): Age 18+ Patient treated with a 

beta-lactam antibiotic (Nafcillin, Oxacillin or Cefazolin) as definitive ther

apy. 








MEDICAL COMPLEXITY


High complexity medical decision making (need 2/3 categories)





Problem - need 4 points


  Acute/new problem with new plan for workup (4 points, 1 max)


  Acute/new problem without additional workup (3 points, 1 max)


  Unstable chronic problem actively being managed (2 point each, 2 max)


  Stable chronic problem actively being managed (1 point each, 2 max)


  Self-limited/transient process (constipation, muscle ache, etc) (1 point each,

2 max)


 





Data - need 4 points


  Reviewed labs/imaging studies (1 points, 2 max)


  Independent review of imaging (EKG, xrays, etc) (2 points, 2 max)


  Discussed case with consult/other MD/RN (2 points, 2 max)





High Risk - qualify if have one of the following:


  Severe exacerbation of acute problem, acute mental status change, IV 

narcotics, monitoring drug levels (vancomycin, INR, tacrolimus etc)











Solo To M.D.              Jan 14, 2021 08:44

## 2021-01-14 NOTE — PULMONOLOGY PROGRESS NOTE
Subjective


ROS Limited/Unobtainable:  No


Constitutional:  Reports: no symptoms


HEENT:  Repors: no symptoms


Respiratory:  Reports: no symptoms


Cardiovascular:  Reports: no symptoms


Gastrointestinal/Abdominal:  Reports: no symptoms


Allergies:  


Coded Allergies:  


     DIATRIZOATE MEGLUMINE (Verified  Allergy, Intermediate, rash, hives on 

face, neck and arms, 7/14/17)


   from 7/3/17


     DIATRIZOATE SODIUM (Verified  Allergy, Intermediate, rash, hives on face, 

neck and arms, 7/14/17)


   from 7/3/17


     IODINE (Verified  Allergy, Intermediate, 7/14/17)


   from gastrografin


     IOPAMIDOL (Verified  Allergy, Intermediate, rash, hives on face, neck and 

arms, 7/14/17)


   occurance from 7/3/17. [id number]


     PENICILLINS (Unverified  Allergy, Unknown, 7/3/17)





Objective





Last 24 Hour Vital Signs








  Date Time  Temp Pulse Resp B/P (MAP) Pulse Ox O2 Delivery O2 Flow Rate FiO2


 


1/14/21 09:00    103/64    


 


1/14/21 09:00  105  103/64    


 


1/14/21 04:00  91      


 


1/14/21 04:00 97.9 74 20 112/65 (81) 92   


 


1/14/21 00:00  105      


 


1/14/21 00:00 99.6 82 20 114/75 (88) 93   


 


1/13/21 22:05      Room Air  


 


1/13/21 20:40 98.0 84 19 115/67 97 Room Air  98


 


1/13/21 18:53 98.0 84 19 115/67 97 Room Air  


 


1/13/21 16:49 98.0 85 15 105/62 96 Room Air  


 


1/13/21 14:24 98.0 87 17 97/58 99 Room Air  


 


1/13/21 13:39 98.0       


 


1/13/21 12:33 98.1 96 20 92/60 (71) 95 Room Air  


 


1/13/21 12:20 98.2 85 18 128/87 98 Room Air  


 


1/13/21 12:20  85 18     98

















Intake and Output  


 


 1/13/21 1/14/21





 19:00 07:00


 


Intake Total 0 ml 300 ml


 


Balance 0 ml 300 ml


 


  


 


Intake Oral 0 ml 300 ml


 


# Voids  3








General Appearance:  no acute distress


HEENT:  atraumatic


Respiratory:  lungs clear


Cardiovascular:  normal rate, regular rhythm


Abdomen:  soft, non tender


Laboratory Tests


1/13/21 12:33: 


White Blood Count 2.5L, Red Blood Count 3.99L, Hemoglobin 12.6, Hematocrit 36.9L

, Mean Corpuscular Volume 92, Mean Corpuscular Hemoglobin 31.6H, Mean 

Corpuscular Hemoglobin Concent 34.2, Red Cell Distribution Width 12.9, Platelet 

Count 105L, Mean Platelet Volume 8.5, Neutrophils (%) (Auto) , Lymphocytes (%) 

(Auto) , Monocytes (%) (Auto) , Eosinophils (%) (Auto) , Basophils (%) (Auto) , 

Differential Total Cells Counted 100, Neutrophils % (Manual) 74, Lymphocytes % 

(Manual) 24, Monocytes % (Manual) 2, Eosinophils % (Manual) 0, Basophils % 

(Manual) 0, Band Neutrophils 0, Platelet Estimate DecreasedL, Platelet 

Morphology Normal, Red Blood Cell Morphology Normal, Prothrombin Time 11.1, 

Prothromb Time International Ratio 1.0, Activated Partial Thromboplast Time 29, 

D-Dimer 0.33, Sodium Level 134L, Potassium Level 3.9, Chloride Level 98, Carbon 

Dioxide Level 25, Anion Gap 11, Blood Urea Nitrogen 15, Creatinine 1.0, Estimat 

Glomerular Filtration Rate 56.4, Glucose Level 143H, Lactic Acid Level 3.90H, 

Calcium Level 8.7, Ferritin 1006H, Total Bilirubin 0.2, Aspartate Amino Transf 

(AST/SGOT) 127H, Alanine Aminotransferase (ALT/SGPT) 165H, Alkaline Phosphatase 

99, Lactate Dehydrogenase 282H, Total Creatine Kinase 303, Troponin I 0.000, C-

Reactive Protein, Quantitative 0.6, Pro-B-Type Natriuretic Peptide 32, Total 

Protein 7.4, Albumin 3.6, Globulin 3.8, Albumin/Globulin Ratio 0.9L


1/13/21 13:23: 


Urine Color Yellow, Urine Appearance Clear, Urine pH 5, Urine Specific Gravity 

1.015, Urine Protein 1+H, Urine Glucose (UA) Negative, Urine Ketones Negative, 

Urine Blood Negative, Urine Nitrite Negative, Urine Bilirubin Negative, Urine 

Urobilinogen Normal, Urine Leukocyte Esterase 1+H, Urine RBC 0, Urine WBC 0-2, 

Urine Squamous Epithelial Cells Occasional, Urine Bacteria Occasional


1/13/21 14:26: Lactic Acid Level 2.00


1/14/21 05:53: POC Whole Blood Glucose 127H


1/14/21 08:14: 


White Blood Count 2.9L, Red Blood Count 3.68L, Hemoglobin 11.5L, Hematocrit 

33.9L, Mean Corpuscular Volume 92, Mean Corpuscular Hemoglobin 31.2H, Mean 

Corpuscular Hemoglobin Concent 33.9, Red Cell Distribution Width 12.9, Platelet 

Count 103L, Mean Platelet Volume 8.9, Neutrophils (%) (Auto) , Lymphocytes (%) 

(Auto) , Monocytes (%) (Auto) , Eosinophils (%) (Auto) , Basophils (%) (Auto) , 

Neutrophils % (Manual) [Pending], Lymphocytes % (Manual) [Pending], Platelet 

Estimate [Pending], Platelet Morphology [Pending], Sodium Level 131L, Potassium 

Level 3.9, Chloride Level 98, Carbon Dioxide Level 27, Anion Gap 6, Blood Urea 

Nitrogen 14, Creatinine 0.8, Estimat Glomerular Filtration Rate > 60, Glucose L

evel 174H, Calcium Level 8.8, Phosphorus Level 2.4L, Magnesium Level 1.8





Current Medications








 Medications


  (Trade)  Dose


 Ordered  Sig/Elian


 Route


 PRN Reason  Start Time


 Stop Time Status Last Admin


Dose Admin


 


 Acetaminophen


  (Tylenol)  650 mg  Q6H  PRN


 ORAL


 PAIN1-3  1/13/21 22:30


 2/12/21 22:29  1/13/21 22:55





 


 Acetaminophen


  (Tylenol)  650 mg  Q6H  PRN


 ORAL


 TEMP 100.5  1/13/21 22:30


 2/12/21 22:29  1/14/21 09:48





 


 Amlodipine


 Besylate


  (Norvasc)  5 mg  DAILY


 ORAL


   1/14/21 09:00


 2/13/21 08:59   





 


 Atorvastatin


 Calcium


  (Lipitor)  5 mg  BEDTIME


 ORAL


   1/14/21 21:00


 4/14/21 20:59   





 


 Azithromycin 500


 mg/Dextrose  275 ml @ 


 275 mls/hr  Q24HRS


 IV


   1/14/21 09:00


 1/20/21 09:59   





 


 Benazepril HCl


  (Lotensin)  20 mg  DAILY


 ORAL


   1/14/21 09:00


 2/13/21 08:59   





 


 Ceftriaxone


 Sodium 1 gm/


 Sodium Chloride  55 ml @ 


 110 mls/hr  DAILY


 IVPB


   1/14/21 09:00


 1/21/21 08:59  1/14/21 09:46





 


 Dexamethasone


 Sodium Phosphate


  (Decadron 4mg/ml


 vial)  6 mg  DAILY


 IVP


   1/14/21 09:00


 4/14/21 08:59  1/14/21 09:43





 


 Dextrose


  (Dextrose 50%)  25 ml  Q30M  PRN


 IV


 Hypoglycemia  1/13/21 22:30


 4/13/21 22:29   





 


 Dextrose


  (Dextrose 50%)  50 ml  Q30M  PRN


 IV


 Hypoglycemia  1/13/21 22:30


 4/13/21 22:29   





 


 Heparin Sodium


  (Porcine)


  (Heparin 5000


 units/ml)  5,000 units  EVERY 12  HOURS


 SUBQ


   1/14/21 09:00


 2/28/21 08:59 UNV  





 


 Insulin Aspart


  (NovoLOG)    BEFORE MEALS AND  HS


 SUBQ


   1/14/21 06:30


 4/14/21 06:29   





 


 Iohexol


  (Omnipaque 350


 100ml)  100 ml  NOW  PRN


 INJ


 Radiology Procedure  1/13/21 12:45


 1/15/21 12:44   





 


 Patient Own


 Medication


  (Patient's Own


 Med)  1 ea  DAILY


 ORAL


   1/14/21 09:00


 2/13/21 08:59  1/14/21 09:47





 


 Patient Own


 Medication


  (Patient's Own


 Med)  1 ea  DAILY


 ORAL


   1/14/21 09:00


 2/13/21 08:59  1/14/21 09:47





 


 Sodium Chloride  1,000 ml @ 


 50 mls/hr  Q20H


 IV


   1/13/21 18:00


 2/12/21 17:59  1/13/21 18:38














Assessment/Plan


Assessment/Plan


1. COVID-19 pneumonia


   - s/p Bamlanivimab in ER


   - s/p Decadron once in ER


   - s/p Ceftriaxoone once in ER


   - remains saturating well on RA


   -Monitor for hypoxia, and provide supplemental oxygen as needed


   - on ceftriaxone, and azithromycin


   - will dc ceftriaxone on dc


   - will transition azithromycin IV to oral for discharge


   - will transition IV Dexamethasone to oral and continue for a total of 10 

days


2. Elevated inflammatory markers


   -CT angio was negative for PE


   -DVT prophylaxis recommended


3. Transaminitis


   Trend LFTs


4. Diabetes mellitus with hyperglycemia


   -On glucose lowering agents


5. Hyponatremia


   -Per primary MD


   - IVF


6.  Hypotension; resolved


   - IVF





Medically stable for discharge from pulmonary standpoint


The care for this patient was discussed with my supervising physician.


Time spent for this case was approximately 31 minutes.











Rusty Esquivel                 Jan 14, 2021 10:11


Torsten Augustin MD           Jan 14, 2021 16:46

## 2021-01-14 NOTE — CONSULTATION
History of Present Illness


General


Date patient seen:  Jan 14, 2021


Reason for Hospitalization:  Dyspnea/Respdistress





Present Illness


HPI


Pending this is a very pleasant 60-year-old female with history of stage IV lung

cancer on therapy presented with shortness of breath concern for deficiency 

abdominal discomfort coughing noted to have abnormal labs admitted further care 

and management.  Surgery called to evaluate assist with care.  Patient seen, 

patient evaluated, chart reviewed.  Cramping abdominal pain has not a bowel 

movement in a few days no nausea vomiting.  Covid pending with history of lung 

cancer on therapy high risk. elevated lft's.  fatty liver


Allergies:  


Coded Allergies:  


     DIATRIZOATE MEGLUMINE (Verified  Allergy, Intermediate, rash, hives on face

, neck and arms, 7/14/17)


   from 7/3/17


     DIATRIZOATE SODIUM (Verified  Allergy, Intermediate, rash, hives on face, 

neck and arms, 7/14/17)


   from 7/3/17


     IODINE (Verified  Allergy, Intermediate, 7/14/17)


   from gastrografin


     IOPAMIDOL (Verified  Allergy, Intermediate, rash, hives on face, neck and 

arms, 7/14/17)


   occurance from 7/3/17. [id number]


     PENICILLINS (Unverified  Allergy, Unknown, 7/3/17)





COVID-19 Screening


Contact w/high risk pt:  Yes


Experienced COVID-19 symptoms?:  Yes


Coronavirus symptoms experienc:  Shortness of Breath





Medication History


Scheduled


Amlodipine Besylate/Benazepril 5-20 Mg (Lotrel 5-20 Mg Capsule*), 1 CAP ORAL 

DAILY, (Reported)


Atorvastatin Calcium* (Atorvastatin Calcium*), 5 MG ORAL BEDTIME, (Reported)


Metformin Hcl* (Metformin Hcl*), 1,000 MG ORAL DAILY, (Reported)


Sitagliptin* (Januvia*), 100 MG ORAL DAILY, (Reported)





Scheduled PRN


Codeine/Promethazine Hcl* (Promethazine-Codeine Syrup*), 5 ML ORAL EVERY 8 HOURS

PRN for For Cough, (Reported)


Hydrocodone Bit/Acetaminophen * (Norco *), 1 TAB ORAL Q4H PRN for 

For Pain, (Reported)


Hydrocodone Bit/Acetaminophen * (Norco *), 1 TAB ORAL Q4H PRN for 

For Pain, (Reported)





Patient History


History Provided By:  Patient, Medical Record, PMD


Healthcare decision maker





Resuscitation status





Advanced Directive on File








Past Medical/Surgical History


Past Medical/Surgical History:  


(1) Lung mass


(2) post video asisted thoracotomy surgey


(3) POST VIDEO ASSISTED THORACOSCOPIC SURGERY


(4) Recurrent pleural effusion on right


(5) Pleural malignant neoplasm


(6) Sepsis


(7) SOB (shortness of breath)


(8) Coronavirus infection





Review of Systems


Review of Symptoms


General ROS: no weight loss or fever


Psychological ROS: no depression or mood changes, no memory loss


Ophthalmic ROS: no visual changes or eye irritation


ENT ROS: no nasal congestion, hearing loss, dizziness


Allergy and Immunology ROS: no allergic symptoms or urticaria


Hematological and Lymphatic ROS: no swollen glands, unusual bleeding or bruising


Endocrine ROS: no polyuria, polydipsia, weight changes, temperature intolerance


Respiratory ROS: no cough, shortness of breath, or wheezing


Cardiovascular ROS: no chest pain or dyspnea on exertion


Gastrointestinal ROS: denies abdominal pain, bright red blood in stool.


Musculoskeletal ROS: no myalgias or arthralgias


Neurological ROS: no TIA or stroke symptoms


Dermatological ROS: no new or changing skin lesions, rashes or pruritis





Physical Exam


Physical Exam


General appearance:  alert, cooperative, no distress, appears stated age


Head:  Normocephalic, without obvious abnormality, atraumatic


Eyes:  conjunctivae/corneas clear. PERRL, EOM's intact. Fundi benign


Throat:  Lips, mucosa, and tongue normal. Teeth and gums normal


Neck:  supple, symmetrical, trachea midline, no adenopathy, thyroid: not 

enlarged, symmetric, no tenderness/mass/nodules, no carotid bruit and no JVD


Lungs:  clear to auscultation bilaterally


Heart:  regular rate and rhythm, S1, S2 normal, no murmur, click, rub or gallop


Abdomen:  soft, non-tender. Bowel sounds normal. No masses,  no organomegaly


Extremities:  extremities normal, atraumatic, no cyanosis or edema


Pulses:  2+ and symmetric


Skin:  Skin color, texture, turgor normal. No rashes or lesions


Neurologic:  Grossly normal





Last 24 Hour Vital Signs








  Date Time  Temp Pulse Resp B/P (MAP) Pulse Ox O2 Delivery O2 Flow Rate FiO2


 


1/14/21 10:18 99.8       


 


1/14/21 09:00      Room Air  


 


1/14/21 09:00    103/64    


 


1/14/21 09:00  105  103/64    


 


1/14/21 08:00  112      


 


1/14/21 08:00 101.8 106 20 103/64 (77) 96   


 


1/14/21 04:00  91      


 


1/14/21 04:00 97.9 74 20 112/65 (81) 92   


 


1/14/21 00:00  105      


 


1/14/21 00:00 99.6 82 20 114/75 (88) 93   


 


1/13/21 22:05      Room Air  


 


1/13/21 20:40 98.0 84 19 115/67 97 Room Air  98


 


1/13/21 18:53 98.0 84 19 115/67 97 Room Air  


 


1/13/21 16:49 98.0 85 15 105/62 96 Room Air  


 


1/13/21 14:24 98.0 87 17 97/58 99 Room Air  


 


1/13/21 13:39 98.0       


 


1/13/21 12:33 98.1 96 20 92/60 (71) 95 Room Air  


 


1/13/21 12:20 98.2 85 18 128/87 98 Room Air  


 


1/13/21 12:20  85 18     98

















Intake and Output  


 


 1/13/21 1/14/21





 19:00 07:00


 


Intake Total 0 ml 300 ml


 


Balance 0 ml 300 ml


 


  


 


Intake Oral 0 ml 300 ml


 


# Voids  3











Laboratory Tests








Test


 1/13/21


12:33 1/13/21


13:23 1/13/21


14:26 1/14/21


05:53


 


White Blood Count


 2.5 K/UL


(4.8-10.8)  L 


 


 





 


Red Blood Count


 3.99 M/UL


(4.20-5.40)  L 


 


 





 


Hemoglobin


 12.6 G/DL


(12.0-16.0) 


 


 





 


Hematocrit


 36.9 %


(37.0-47.0)  L 


 


 





 


Mean Corpuscular Volume 92 FL (80-99)     


 


Mean Corpuscular Hemoglobin


 31.6 PG


(27.0-31.0)  H 


 


 





 


Mean Corpuscular Hemoglobin


Concent 34.2 G/DL


(32.0-36.0) 


 


 





 


Red Cell Distribution Width


 12.9 %


(11.6-14.8) 


 


 





 


Platelet Count


 105 K/UL


(150-450)  L 


 


 





 


Mean Platelet Volume


 8.5 FL


(6.5-10.1) 


 


 





 


Neutrophils (%) (Auto)


 % (45.0-75.0)


 


 


 





 


Lymphocytes (%) (Auto)


 % (20.0-45.0)


 


 


 





 


Monocytes (%) (Auto)  % (1.0-10.0)     


 


Eosinophils (%) (Auto)  % (0.0-3.0)     


 


Basophils (%) (Auto)  % (0.0-2.0)     


 


Differential Total Cells


Counted 100  


 


 


 





 


Neutrophils % (Manual) 74 % (45-75)     


 


Lymphocytes % (Manual) 24 % (20-45)     


 


Monocytes % (Manual) 2 % (1-10)     


 


Eosinophils % (Manual) 0 % (0-3)     


 


Basophils % (Manual) 0 % (0-2)     


 


Band Neutrophils 0 % (0-8)     


 


Platelet Estimate Decreased  L   


 


Platelet Morphology Normal     


 


Red Blood Cell Morphology Normal     


 


Prothrombin Time


 11.1 SEC


(9.30-11.50) 


 


 





 


Prothromb Time International


Ratio 1.0 (0.9-1.1)  


 


 


 





 


Activated Partial


Thromboplast Time 29 SEC (23-33)


 


 


 





 


D-Dimer


 0.33 mg/L FEU


(0.00-0.49) 


 


 





 


Sodium Level


 134 MMOL/L


(136-145)  L 


 


 





 


Potassium Level


 3.9 MMOL/L


(3.5-5.1) 


 


 





 


Chloride Level


 98 MMOL/L


() 


 


 





 


Carbon Dioxide Level


 25 MMOL/L


(21-32) 


 


 





 


Anion Gap


 11 mmol/L


(5-15) 


 


 





 


Blood Urea Nitrogen


 15 mg/dL


(7-18) 


 


 





 


Creatinine


 1.0 MG/DL


(0.55-1.30) 


 


 





 


Estimat Glomerular Filtration


Rate 56.4 mL/min


(>60) 


 


 





 


Glucose Level


 143 MG/DL


()  H 


 


 





 


Lactic Acid Level


 3.90 mmol/L


(0.4-2.0)  H 


 2.00 mmol/L


(0.66-2.22) 





 


Calcium Level


 8.7 MG/DL


(8.5-10.1) 


 


 





 


Ferritin


 1006 NG/ML


(8-388)  H 


 


 





 


Total Bilirubin


 0.2 MG/DL


(0.2-1.0) 


 


 





 


Aspartate Amino Transf


(AST/SGOT) 127 U/L


(15-37)  H 


 


 





 


Alanine Aminotransferase


(ALT/SGPT) 165 U/L


(12-78)  H 


 


 





 


Alkaline Phosphatase


 99 U/L


() 


 


 





 


Lactate Dehydrogenase


 282 U/L


()  H 


 


 





 


Total Creatine Kinase


 303 U/L


() 


 


 





 


Troponin I


 0.000 ng/mL


(0.000-0.056) 


 


 





 


C-Reactive Protein,


Quantitative 0.6 mg/dL


(0.00-0.90) 


 


 





 


Pro-B-Type Natriuretic Peptide


 32 pg/mL


(0-125) 


 


 





 


Total Protein


 7.4 G/DL


(6.4-8.2) 


 


 





 


Albumin


 3.6 G/DL


(3.4-5.0) 


 


 





 


Globulin 3.8 g/dL     


 


Albumin/Globulin Ratio


 0.9 (1.0-2.7)


L 


 


 





 


Urine Color  Yellow    


 


Urine Appearance  Clear    


 


Urine pH  5 (4.5-8.0)    


 


Urine Specific Gravity


 


 1.015


(1.005-1.035) 


 





 


Urine Protein


 


 1+ (NEGATIVE)


H 


 





 


Urine Glucose (UA)


 


 Negative


(NEGATIVE) 


 





 


Urine Ketones


 


 Negative


(NEGATIVE) 


 





 


Urine Blood


 


 Negative


(NEGATIVE) 


 





 


Urine Nitrite


 


 Negative


(NEGATIVE) 


 





 


Urine Bilirubin


 


 Negative


(NEGATIVE) 


 





 


Urine Urobilinogen


 


 Normal MG/DL


(0.0-1.0) 


 





 


Urine Leukocyte Esterase


 


 1+ (NEGATIVE)


H 


 





 


Urine RBC


 


 0 /HPF (0 - 2)


 


 





 


Urine WBC


 


 0-2 /HPF (0 -


2) 


 





 


Urine Squamous Epithelial


Cells 


 Occasional


/LPF 


 





 


Urine Bacteria


 


 Occasional


/HPF (NONE) 


 





 


POC Whole Blood Glucose


 


 


 


 127 MG/DL


()  H


 


Test


 1/14/21


08:14 


 


 





 


White Blood Count


 2.9 K/UL


(4.8-10.8)  L 


 


 





 


Red Blood Count


 3.68 M/UL


(4.20-5.40)  L 


 


 





 


Hemoglobin


 11.5 G/DL


(12.0-16.0)  L 


 


 





 


Hematocrit


 33.9 %


(37.0-47.0)  L 


 


 





 


Mean Corpuscular Volume 92 FL (80-99)     


 


Mean Corpuscular Hemoglobin


 31.2 PG


(27.0-31.0)  H 


 


 





 


Mean Corpuscular Hemoglobin


Concent 33.9 G/DL


(32.0-36.0) 


 


 





 


Red Cell Distribution Width


 12.9 %


(11.6-14.8) 


 


 





 


Platelet Count


 103 K/UL


(150-450)  L 


 


 





 


Mean Platelet Volume


 8.9 FL


(6.5-10.1) 


 


 





 


Neutrophils (%) (Auto)


 % (45.0-75.0)


 


 


 





 


Lymphocytes (%) (Auto)


 % (20.0-45.0)


 


 


 





 


Monocytes (%) (Auto)  % (1.0-10.0)     


 


Eosinophils (%) (Auto)  % (0.0-3.0)     


 


Basophils (%) (Auto)  % (0.0-2.0)     


 


Differential Total Cells


Counted 100  


 


 


 





 


Neutrophils % (Manual) 73 % (45-75)     


 


Lymphocytes % (Manual) 24 % (20-45)     


 


Monocytes % (Manual) 3 % (1-10)     


 


Eosinophils % (Manual) 0 % (0-3)     


 


Basophils % (Manual) 0 % (0-2)     


 


Band Neutrophils 0 % (0-8)     


 


Platelet Estimate Decreased  L   


 


Platelet Morphology Normal     


 


Red Blood Cell Morphology Normal     


 


Sodium Level


 131 MMOL/L


(136-145)  L 


 


 





 


Potassium Level


 3.9 MMOL/L


(3.5-5.1) 


 


 





 


Chloride Level


 98 MMOL/L


() 


 


 





 


Carbon Dioxide Level


 27 MMOL/L


(21-32) 


 


 





 


Anion Gap


 6 mmol/L


(5-15) 


 


 





 


Blood Urea Nitrogen


 14 mg/dL


(7-18) 


 


 





 


Creatinine


 0.8 MG/DL


(0.55-1.30) 


 


 





 


Estimat Glomerular Filtration


Rate > 60 mL/min


(>60) 


 


 





 


Glucose Level


 174 MG/DL


()  H 


 


 





 


Calcium Level


 8.8 MG/DL


(8.5-10.1) 


 


 





 


Phosphorus Level


 2.4 MG/DL


(2.5-4.9)  L 


 


 





 


Magnesium Level


 1.8 MG/DL


(1.8-2.4) 


 


 











Height (Feet):  5


Height (Inches):  3.00


Weight (Pounds):  160


Medications





Current Medications








 Medications


  (Trade)  Dose


 Ordered  Sig/Elian


 Route


 PRN Reason  Start Time


 Stop Time Status Last Admin


Dose Admin


 


 Acetaminophen


  (Tylenol)  650 mg  Q6H  PRN


 ORAL


 PAIN1-3  1/13/21 22:30


 2/12/21 22:29  1/13/21 22:55





 


 Acetaminophen


  (Tylenol)  650 mg  Q6H  PRN


 ORAL


 TEMP 100.5  1/13/21 22:30


 2/12/21 22:29  1/14/21 09:48





 


 Amlodipine


 Besylate


  (Norvasc)  5 mg  DAILY


 ORAL


   1/14/21 09:00


 2/13/21 08:59   





 


 Atorvastatin


 Calcium


  (Lipitor)  5 mg  BEDTIME


 ORAL


   1/14/21 21:00


 4/14/21 20:59   





 


 Azithromycin


  (Zithromax)  500 mg  DAILY


 ORAL


   1/15/21 09:00


 1/20/21 08:59   





 


 Benazepril HCl


  (Lotensin)  20 mg  DAILY


 ORAL


   1/14/21 09:00


 2/13/21 08:59   





 


 Ceftriaxone


 Sodium 1 gm/


 Sodium Chloride  55 ml @ 


 110 mls/hr  DAILY


 IVPB


   1/14/21 09:00


 1/21/21 08:59  1/14/21 09:46





 


 Dexamethasone


  (Decadron)  6 mg  DAILY


 ORAL


   1/15/21 09:00


 1/24/21 08:59 UNV  





 


 Dexamethasone


 Sodium Phosphate


  (Decadron 4mg/ml


 vial)  6 mg  DAILY


 IVP


   1/14/21 09:00


 1/22/21 12:00  1/14/21 09:43





 


 Dextrose


  (Dextrose 50%)  25 ml  Q30M  PRN


 IV


 Hypoglycemia  1/13/21 22:30


 4/13/21 22:29   





 


 Dextrose


  (Dextrose 50%)  50 ml  Q30M  PRN


 IV


 Hypoglycemia  1/13/21 22:30


 4/13/21 22:29   





 


 Heparin Sodium


  (Porcine)


  (Heparin 5000


 units/ml)  5,000 units  EVERY 12  HOURS


 SUBQ


   1/14/21 21:00


 2/28/21 20:59   





 


 Insulin Aspart


  (NovoLOG)    BEFORE MEALS AND  HS


 SUBQ


   1/14/21 06:30


 4/14/21 06:29   





 


 Iohexol


  (Omnipaque 350


 100ml)  100 ml  NOW  PRN


 INJ


 Radiology Procedure  1/13/21 12:45


 1/15/21 12:44   





 


 Patient Own


 Medication


  (Patient's Own


 Med)  1 ea  DAILY


 ORAL


   1/14/21 09:00


 2/13/21 08:59  1/14/21 09:47





 


 Patient Own


 Medication


  (Patient's Own


 Med)  1 ea  DAILY


 ORAL


   1/14/21 09:00


 2/13/21 08:59  1/14/21 09:47





 


 Sodium Chloride  1,000 ml @ 


 50 mls/hr  Q20H


 IV


   1/13/21 18:00


 2/12/21 17:59  1/13/21 18:38














Assessment/Plan


Problem List:  


(1) Pleural malignant neoplasm


Assessment & Plan:  prior right pleurex placed in 2017


ICD Codes:  C38.4 - Malignant neoplasm of pleura


SNOMED:  749480326


(2) Sepsis


Assessment & Plan:  leukopenia


abnormal lf'ts


lactic acidosis


pleural effusion


lung mass


hx stage 4 lung ca


high risk for superimposed infection


hold on tap or drain or tube


cont abx


okay for diet


bowel regimen as abd pain likely constipation


lft's fatty liver will monitor


thank you 


will follow with recs


ICD Codes:  A41.9 - Sepsis, unspecified organism


SNOMED:  21326105


(3) SOB (shortness of breath)


ICD Codes:  R06.02 - Shortness of breath


SNOMED:  322416016


(4) Lung mass


Assessment & Plan:  60-year-old female history of stage IV lung cancer on 

therapy currently.  Imaging reviewed.  Patient is currently in a therapy as an 

outpatient with her pulmonologist and oncologist.


Continue with current care plan


The pulmonary arteries are adequately opacified. No intraluminal filling


defects or other findings to suggest acute pulmonary embolus are demonstrated. 

No


evidence of pulmonary arterial dilatation. No evidence of thoracic aortic 

aneurysm or


dissection. Normal branching anatomy and caliber of the great neck vessels. No


evidence of right ventricular dilatation. The included proximal abdominal 

visceral


vessels are unremarkable.


 


There is a mass in the right pulmonary perihilar region. This measures 

approximately


4.6 x 3.3 x 3.1 cm in diameter. It is mostly hypoattenuating with some areas of


hyperattenuation. Some masslike opacity also extends from this into the major


fissure. There are some areas of density at the pleural surface of the right 

lower


lobe. The largest of these is somewhat masslike, measures 2.1 x 4.5 cm. Numerous


bands of atelectasis are seen throughout the right lower lobe and to some extent


within the right upper and middle lobes. There is considerable pleural 

thickening on


the right, particularly in the mid and lower lung. Some of the pleural 

thickening is


very dense. There may be trace pleural fluid on the right. Findings are 

difficult to


compare to the previous exam, as previous study demonstrated a massive pleural


effusion resulting in atelectasis of the entire right lower lobe, most of the 

right


middle lobe, and much of the upper lobe. However, the right hilar mass was rep

orted


on the previous exam


 


Numerous areas of groundglass opacity are seen scattered throughout the left 

lung.


Findings in the left lung are new since the previous study. No pleural effusion 

or


mass is seen on the left. No dense consolidation is seen on the left. Findings 

on the


left are new since the prior study


 


The heart is mildly enlarged. No pericardial effusion. There is some prominence 

to


the perihilar nodes on the left but no alice adenopathy. No mediastinal


lymphadenopathy. The included portion of the thyroid is unremarkable. No 

axillary or


chest wall mass or adenopathy.


 


The included upper abdominal anatomy demonstrates somewhat enlarged 

hypoattenuating


liver. This is also demonstrated previously.


 


Impression: No evidence of acute pulmonary embolus or other acute thoracic 

vascular


pathology


 


Groundglass opacities in the left lung, raises concern for multifocal pneumonia,


quite possibly viral. Note in usual absence of similar findings on the right


 


Right pulmonary perihilar mass, suspicious for malignancy, also previously 

described.


Correlate with results in any prior workup


 


Pleural thickening and nodularity on the right, somewhat similar to findings


previously described but more advanced. As previously reported, likely 

represents


metastatic deposits. The absence of pleural fluid and the presence of possible


calcification in the pleural space raises possibility of interim pleurodesis.


Correlate with surgical history


 


Right basilar scarring and/or atelectasis


 


Mild cardiomegaly


 


Enlarged fatty liver


ICD Codes:  R91.8 - Other nonspecific abnormal finding of lung field


SNOMED:  332769104


(5) Coronavirus infection


ICD Codes:  B34.2 - Coronavirus infection, unspecified


SNOMED:  908184265


(6) Recurrent pleural effusion on right


ICD Codes:  J90 - Pleural effusion, not elsewhere classified


SNOMED:  56258591


(7) post video asisted thoracotomy long


(8) POST VIDEO ASSISTED THORACOSCOPIC SURGERY











Vicente Mcneal                Jan 14, 2021 12:16

## 2021-01-14 NOTE — NUR
NURSE HAND-OFF REPORT: 



Important Events on Shift:[ADMITTED FOR COVID POSITIVE]

Patient Status: [STABLE/ FULL CODE]

Diet: [DIABETIC]



Pending Orders: []

Pending Results/Labs:[]

Pending MD notification:[]



Latest Vital Signs: Temperature 97.9 , Pulse 74 , B/P 112 /65 , Respiratory Rate 20 , O2 SAT 
92 , Room Air, O2 Flow Rate .  

Vital Sign Comment: []



EKG Rhythm: Sinus Rhythm

Rhythm change?: Y 

MD Notified?: N -

MD Response: 



Latest Guillermo Fall Score: 35  

Fall Risk: Medium Risk 

Safety Measures: Call light Within Reach, Bed Alarm Zone 1, Side Rails Side Rails x2, Bed 
position Low and Locked.

Fall Precautions: 

Yellow Socks

Yellow Gown

Door Sign

Patient Fall Education



Report given to [LAMONT ROBERTS].

## 2021-01-14 NOTE — NUR
NURSE NOTES:

Patient had a fever of 101.8, gave Tylenol per PRN order and notified MD. Md made aware. 
Will turn patients AC on and recheck Temp.

## 2021-01-15 NOTE — DISCHARGE SUMMARY
Discharge Summary


Discharge Summary


_


Date of admission: 1/13/2021





Date of discharge: 1/14/2021





Discharged by Dr. To





History of Present Illness and Brief Hospital Course


Ms. Colon is a 60-year-old female with past medical history of diabetes 

mellitus, and stage IV lung cancer, who was referred to ER by her pulmonologist 

for COVID-19 positive status with hypoxia.  However, patient was reported to be 

saturating well on room air on arrival.  Patient reported of 3 days history of 

pleuritic chest pain and shortness of breath along with headache and dizziness. 

Given her high risk for PE, she was evaluated with chest angiogram of chest 

which showed no evidence of acute pulmonary embolus or other acute thoracic 

vascular pathology.  Chest x-ray was notable for bilateral peripheral 

infiltrates, and possible small effusion on the right.  The exact date of COVID-

19 test was not available from her EMR.  Patient was given Rocephin, and 

bamalnivimab in the ER and was admitted to the hospital for further management. 





For her COVID-19 pneumonia, she received ceftriaxone and azithromycin along with

dexamethasone.  She remained to be saturating well on room air.  Given her 

medically stable condition, it was determined that it would be best for her to 

be discharged home on medical therapy.  IV ceftriaxone was discontinued before 

discharge.  IV azithromycin and IV dexamethasone were transitioned to oral form 

before discharge.





She reported to be taking an experimental chemo drug for her stage IV lung 

cancer at home which was continued during her admission.





On 1/14/2021 patient was medically stable for discharge.  Patient was discharged

home with daughter Silvia Colon in a private vehicle.  Her home experimental 

chemo medication that was kept with [facility] pharmacy was returned

to the patient before discharge.





Consultants:


Pulmonology Dr. Augustin





Discharge Condition


Improved and stable





Final diagnoses


COVID-19 pneumonia


History of stage IV lung cancer


History of hypertension


Diabetes mellitus


Lactic acidosis


Hypoxemic respiratory failure


Hypotension





I have been assigned to dictate discharge summary for this account.











Rusty Esquivel                 Nam 15, 2021 14:57